# Patient Record
Sex: MALE | Race: ASIAN | NOT HISPANIC OR LATINO
[De-identification: names, ages, dates, MRNs, and addresses within clinical notes are randomized per-mention and may not be internally consistent; named-entity substitution may affect disease eponyms.]

---

## 2017-01-23 ENCOUNTER — APPOINTMENT (OUTPATIENT)
Dept: ORTHOPEDIC SURGERY | Facility: CLINIC | Age: 22
End: 2017-01-23

## 2017-01-23 VITALS
SYSTOLIC BLOOD PRESSURE: 138 MMHG | HEIGHT: 78 IN | BODY MASS INDEX: 25.11 KG/M2 | HEART RATE: 64 BPM | WEIGHT: 217 LBS | DIASTOLIC BLOOD PRESSURE: 76 MMHG

## 2017-01-23 DIAGNOSIS — Z87.09 PERSONAL HISTORY OF OTHER DISEASES OF THE RESPIRATORY SYSTEM: ICD-10-CM

## 2017-01-23 DIAGNOSIS — Z78.9 OTHER SPECIFIED HEALTH STATUS: ICD-10-CM

## 2017-01-23 DIAGNOSIS — Z56.0 UNEMPLOYMENT, UNSPECIFIED: ICD-10-CM

## 2017-01-23 DIAGNOSIS — Z80.1 FAMILY HISTORY OF MALIGNANT NEOPLASM OF TRACHEA, BRONCHUS AND LUNG: ICD-10-CM

## 2017-01-23 DIAGNOSIS — S63.501A UNSPECIFIED SPRAIN OF RIGHT WRIST, INITIAL ENCOUNTER: ICD-10-CM

## 2017-01-23 RX ORDER — MELOXICAM 7.5 MG/1
7.5 TABLET ORAL
Qty: 30 | Refills: 1 | Status: ACTIVE | COMMUNITY
Start: 2017-01-23 | End: 1900-01-01

## 2017-01-23 SDOH — ECONOMIC STABILITY - INCOME SECURITY: UNEMPLOYMENT, UNSPECIFIED: Z56.0

## 2019-07-04 ENCOUNTER — EMERGENCY (EMERGENCY)
Facility: HOSPITAL | Age: 24
LOS: 1 days | Discharge: HOME | End: 2019-07-04
Attending: EMERGENCY MEDICINE | Admitting: EMERGENCY MEDICINE
Payer: COMMERCIAL

## 2019-07-04 VITALS
RESPIRATION RATE: 18 BRPM | HEART RATE: 95 BPM | TEMPERATURE: 98 F | OXYGEN SATURATION: 100 % | DIASTOLIC BLOOD PRESSURE: 83 MMHG | SYSTOLIC BLOOD PRESSURE: 135 MMHG

## 2019-07-04 PROCEDURE — 99284 EMERGENCY DEPT VISIT MOD MDM: CPT

## 2019-07-04 RX ORDER — FAMOTIDINE 10 MG/ML
20 INJECTION INTRAVENOUS ONCE
Refills: 0 | Status: COMPLETED | OUTPATIENT
Start: 2019-07-04 | End: 2019-07-04

## 2019-07-04 RX ORDER — EPINEPHRINE 0.3 MG/.3ML
0.3 INJECTION INTRAMUSCULAR; SUBCUTANEOUS ONCE
Refills: 0 | Status: DISCONTINUED | OUTPATIENT
Start: 2019-07-04 | End: 2019-07-04

## 2019-07-04 RX ORDER — DIPHENHYDRAMINE HCL 50 MG
50 CAPSULE ORAL ONCE
Refills: 0 | Status: COMPLETED | OUTPATIENT
Start: 2019-07-04 | End: 2019-07-04

## 2019-07-04 RX ADMIN — FAMOTIDINE 20 MILLIGRAM(S): 10 INJECTION INTRAVENOUS at 22:55

## 2019-07-04 RX ADMIN — Medication 50 MILLIGRAM(S): at 22:55

## 2019-07-04 RX ADMIN — Medication 125 MILLIGRAM(S): at 22:55

## 2019-07-04 NOTE — ED PROVIDER NOTE - OBJECTIVE STATEMENT
24 year-old male with allergy to almonds presents to the Emergency Department for allergic reaction; ongoing for 1 hour PTA.  Patient had oat mild when he began to have sore throat.  No fevers, chills, nausea, vomiting, chest pain, shortness of breath, rash.  Patient took Benadryl one hour PTA. 24 year-old male with allergy to almonds presents to the Emergency Department for allergic reaction; ongoing for 1 hour PTA.  Patient had oat mild when he began to have sore throat.  + hives initially (now resolved).  No fevers, chills, nausea, vomiting, chest pain, shortness of breath.  Patient took Benadryl one hour PTA.

## 2019-07-04 NOTE — ED PROVIDER NOTE - ATTENDING CONTRIBUTION TO CARE
I, Jennifer Cabot, MD, have performed a history and physical exam of the patient and discussed their management with the resident. I reviewed the resident's note and agree with the documented findings and plan of care. My medical decision making and observations are found above.    Cabot: 24M with PMH of EVAN to nuts, here with allergic reaction after eating oat milk 1 hour PTA.  Reports edema of the eyelids and throat, as well as hives.  No trouble swallowing or breathing.  Mildly hoarse voice.  No abd pain, N/V.  Took benadryl at home with significant improvement in sx.  On exam, HDS, NAD, MMM, mildly hoarse voice, oropharynx without edema other than trace uvular edema, uvula is midline, no stridor, eyelids with mild edema bilat, no chemosis, lungs CTAB, heart sounds normal, abd soft, NT, ND, no CVAT, LEs without edema, wwp, skin normal temperature and color, neuro: alert and oriented, no focal deficits.  Airway is not threatened.  Will give additional benadryl, H2B, steroids, monitor at least 6 hours.

## 2019-07-04 NOTE — ED ADULT TRIAGE NOTE - CHIEF COMPLAINT QUOTE
c/o throat/face swelling, wheezing, and difficulty breathing x 1 hour after drinking oat milk. 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

## 2019-07-04 NOTE — ED PROVIDER NOTE - PROGRESS NOTE DETAILS
Kira: Pt signed out to me by Dr Cabot pending reassessment. Pt feeling much better, well appearing. No oropharyngeal edema. will continue to observe. Elizabeth Goldberger PGY-3: pt signed out to me pending reeval after meds for allergic rxn. Pt feeling well 6 hrs post treatment. Lungs clear. No pruritus. Uvula appears wnl. Pt feels well to go home. States already has epi pen at home. Stable for dc

## 2019-07-04 NOTE — ED ADULT NURSE NOTE - OBJECTIVE STATEMENT
Allyson RN: Patient is a 23 y/o M a&ox4, denies PMH, p/w a c/c of allergic reaction including swelling of face and around eyes, dysphonia reporting his voice is more nasal, and shortness of breath.  Patient reported this began approx. 30 minutes after exposure to oat milk, one hour ago total.  Patient endorses h xof anaphylaxis 2/2 to almonds.  On exam patient has uvular swelling, swelling around eyes and reports endorses dysphonia.  Respirations unlabored, patient able to speak comfortably in full sentences, no drooling or stridor noted.  Patient denies F/C, abd pain, GI/ symptoms.  20 gauge PIV in right hand, MD Douglas evaluating patient, patient endorsed to Jonathon PRYOR.

## 2019-07-04 NOTE — ED PROVIDER NOTE - PHYSICAL EXAMINATION
*Gen: NAD, AAO*3  *HEENT: NC/AT, MMM, no oropharyngeal swelling, + mild uvula swelling, speaking in full sentences, airway patent, trachea midline  *CV: RRR, S1/S2 present, no murmurs/rubs  *Resp: no respiratory distress, LCTAB, no wheezing/rales  *Abd: non-distended, soft N/Tx4, no guarding or rigidity  *Neuro: no focal neuro deficits, moving all limbs appropriately  *Extremities: no gross deformity  *Skin: no rashes, no wounds   ~ Carmelo Douglas M.D.

## 2019-07-04 NOTE — ED PROVIDER NOTE - CLINICAL SUMMARY MEDICAL DECISION MAKING FREE TEXT BOX
24 year-old male with allergy to almonds presents to the Emergency Department for allergic reaction; ongoing for 1 hour PTA; likely allergic reaction.  Patient non-toxic and no acute resp. distress.  Plan for benadryl, pepcid and steroids.

## 2019-07-04 NOTE — ED PROVIDER NOTE - NSFOLLOWUPINSTRUCTIONS_ED_ALL_ED_FT
You were seen in the emergency department for allergic reaction. Please follow up with your primary doctor in the next 2-3 days. Take prednisone 40 milligrams once a day for the next 4 days. Make sure to always carry an epi-pen with you, and to use it with onset of shortness of breath, wheezing, throat closing/swelling, rash or vomiting/diarrhea. Return to the emergency department immediately if you experience recurrence of or worsening of any of these symptoms, or any other concerning symptoms.

## 2019-07-04 NOTE — ED ADULT NURSE NOTE - NSIMPLEMENTINTERV_GEN_ALL_ED
Implemented All Universal Safety Interventions:  [place] to call system. Call bell, personal items and telephone within reach. Instruct patient to call for assistance. Room bathroom lighting operational. Non-slip footwear when patient is off stretcher. Physically safe environment: no spills, clutter or unnecessary equipment. Stretcher in lowest position, wheels locked, appropriate side rails in place.

## 2019-07-05 VITALS
TEMPERATURE: 97 F | OXYGEN SATURATION: 100 % | DIASTOLIC BLOOD PRESSURE: 56 MMHG | SYSTOLIC BLOOD PRESSURE: 98 MMHG | HEART RATE: 74 BPM | RESPIRATION RATE: 16 BRPM

## 2021-01-27 ENCOUNTER — TRANSCRIPTION ENCOUNTER (OUTPATIENT)
Age: 26
End: 2021-01-27

## 2021-01-27 ENCOUNTER — EMERGENCY (EMERGENCY)
Facility: HOSPITAL | Age: 26
LOS: 1 days | Discharge: HOME | End: 2021-01-27
Admitting: EMERGENCY MEDICINE
Payer: MEDICAID

## 2021-01-27 VITALS
HEART RATE: 88 BPM | OXYGEN SATURATION: 98 % | SYSTOLIC BLOOD PRESSURE: 147 MMHG | DIASTOLIC BLOOD PRESSURE: 82 MMHG | RESPIRATION RATE: 18 BRPM | TEMPERATURE: 98 F

## 2021-01-27 VITALS — HEART RATE: 90 BPM | OXYGEN SATURATION: 99 % | RESPIRATION RATE: 18 BRPM

## 2021-01-27 PROCEDURE — 71045 X-RAY EXAM CHEST 1 VIEW: CPT | Mod: 26

## 2021-01-27 PROCEDURE — 99283 EMERGENCY DEPT VISIT LOW MDM: CPT

## 2021-01-27 NOTE — ED PROVIDER NOTE - OBJECTIVE STATEMENT
26 y/o male with no significant PMHx presents to the ER c/o 5 days of dry cough, fatigue and body aches.  Pt reports hx of COVID in March 2020 and states the symptoms are similar.  Pt reports +COVID exposure(girlfriends mother).  Pt denies chest pain, shortness of breath, fevers, nausea, vomiting, abdominal pain, back.

## 2021-01-27 NOTE — ED PROVIDER NOTE - PROGRESS NOTE DETAILS
CHIQUITA Sharpe: pt feels well ambulatory without difficulty or hypoxia.  Will discharge home with COVID precautions.  Ambulatory sat 99%.

## 2021-01-27 NOTE — ED PROVIDER NOTE - CLINICAL SUMMARY MEDICAL DECISION MAKING FREE TEXT BOX
26 y/o male with no significant PMHx presents to the ER c/o 5 days of dry cough, fatigue and body aches.  Pt reports hx of COVID in March 2020 and states the symptoms are similar.  Pt reports +COVID exposure(girlfriends mother). Pt is well appearing, NAD, lungs CTA, likely COVID, COVID test, CXR, follow up PMD.

## 2021-01-27 NOTE — ED PROVIDER NOTE - PATIENT PORTAL LINK FT
You can access the FollowMyHealth Patient Portal offered by [facility] by registering at the following website: [email or web address]. By joining [facility]s FollowMyHealth portal, you will also be able to view your health information using other applications (apps) compatible with our system.

## 2021-01-27 NOTE — ED ADULT TRIAGE NOTE - CHIEF COMPLAINT QUOTE
presents for Covid-19 testing.  Mild body aches, dry cough x few days.  Pt's girlfriends mother is positive for Covid-19.  Pt endorses being Covid-19 positive in the past

## 2021-01-28 PROBLEM — Z78.9 OTHER SPECIFIED HEALTH STATUS: Chronic | Status: ACTIVE | Noted: 2019-07-04

## 2021-01-28 LAB — SARS-COV-2 RNA SPEC QL NAA+PROBE: SIGNIFICANT CHANGE UP
